# Patient Record
Sex: FEMALE | Race: WHITE | Employment: PART TIME | URBAN - METROPOLITAN AREA
[De-identification: names, ages, dates, MRNs, and addresses within clinical notes are randomized per-mention and may not be internally consistent; named-entity substitution may affect disease eponyms.]

---

## 2023-02-23 ENCOUNTER — OFFICE VISIT (OUTPATIENT)
Dept: SURGERY | Age: 33
End: 2023-02-23
Payer: COMMERCIAL

## 2023-02-23 DIAGNOSIS — N63.11 MASS OF UPPER OUTER QUADRANT OF RIGHT BREAST: Primary | ICD-10-CM

## 2023-02-23 PROCEDURE — 99203 OFFICE O/P NEW LOW 30 MIN: CPT | Performed by: SURGERY

## 2023-02-23 PROCEDURE — 76642 ULTRASOUND BREAST LIMITED: CPT | Performed by: SURGERY

## 2023-02-23 RX ORDER — DEXTROAMPHETAMINE SACCHARATE, AMPHETAMINE ASPARTATE, DEXTROAMPHETAMINE SULFATE AND AMPHETAMINE SULFATE 2.5; 2.5; 2.5; 2.5 MG/1; MG/1; MG/1; MG/1
TABLET ORAL
COMMUNITY
Start: 2023-02-15

## 2023-02-23 NOTE — PROGRESS NOTES
HISTORY OF PRESENT ILLNESS  Alvin Castro is a 29 y.o. female. HPI NEW patient consult referred by Dr. Jade Gold for a RIGHT breast mass found by pt 2 months ago. Small red dot appeared initially. Family History  Maternal and paternal great grandmothers both  from breast cancer, age 48 and [de-identified]   2 paternal 2nd cousins had breast cancer in their 42's, survivor. Paternal aunt had colon cancer, survivor    Mammogram, n/a, BIRADS n/a  No past medical history on file. No past surgical history on file. OB History    No obstetric history on file. No family history on file. Social History     Tobacco Use    Smoking status: Never    Smokeless tobacco: Not on file   Substance Use Topics    Alcohol use: No      Prior to Admission medications    Medication Sig Start Date End Date Taking? Authorizing Provider   dextroamphetamine-amphetamine (ADDERALL) 10 mg tablet TAKE 1 TABLET BY MOUTH THREE TIMES A DAY WITH FOOD 2/15/23   Provider, Historical   ibuprofen (MOTRIN) 600 mg tablet Take 1 Tab by mouth every six (6) hours as needed for Pain. 12   Eduar Humphrey MD      No Known Allergies      Review of Systems   All other systems reviewed and are negative. Physical Exam  Chest:   Breasts:     Breasts are symmetrical.      Right: Mass (UOQ dense ridge) present. No inverted nipple, nipple discharge, skin change or tenderness. Left: No inverted nipple, mass, nipple discharge, skin change or tenderness. Lymphadenopathy:      Upper Body:      Right upper body: No supraclavicular or axillary adenopathy. Left upper body: No supraclavicular or axillary adenopathy. BREAST ULTRASOUND  Indication: RIGHT breast mass UOQ  Technique: The RIGHT breast and axilla were scanned using a high-frequency linear-array near-field transducer  Findings:  [facility] [address] of normal dense breast tissue corresponds with the palpable mass. No abnormal mass, lesion, or shadowing noted. No cysts.  No axillary lymphadenopathy  Impression: Normal breast tissue   Disposition: No worrisome finding on ultrasound   ASSESSMENT and PLAN    ICD-10-CM ICD-9-CM    1. Mass of upper outer quadrant of right breast  N63.11 611.72       Total time spent on chart review and patient visit: 30 minutes    RIGHT breast mass is a ridge of normal dense breast tissue. Discussed self breast exam and explained what a noncancerous mass feels like (smooth, round and mobile) compared with a cancerous mass (hard, fixed, bumpy and often associated with a skin dimple). Family hx is remote.   No indication for BRCA testing  Start mammograms age 38
